# Patient Record
Sex: MALE | Race: OTHER | NOT HISPANIC OR LATINO
[De-identification: names, ages, dates, MRNs, and addresses within clinical notes are randomized per-mention and may not be internally consistent; named-entity substitution may affect disease eponyms.]

---
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
## 2020-08-12 ENCOUNTER — TRANSCRIPTION ENCOUNTER (OUTPATIENT)
Age: 20
End: 2020-08-12

## 2022-12-13 ENCOUNTER — APPOINTMENT (OUTPATIENT)
Dept: ORTHOPEDIC SURGERY | Facility: CLINIC | Age: 22
End: 2022-12-13

## 2022-12-13 VITALS — HEIGHT: 68 IN | WEIGHT: 150 LBS | BODY MASS INDEX: 22.73 KG/M2

## 2022-12-13 DIAGNOSIS — S93.491A SPRAIN OF OTHER LIGAMENT OF RIGHT ANKLE, INITIAL ENCOUNTER: ICD-10-CM

## 2022-12-13 PROCEDURE — 99203 OFFICE O/P NEW LOW 30 MIN: CPT

## 2022-12-13 PROCEDURE — 73610 X-RAY EXAM OF ANKLE: CPT | Mod: RT

## 2022-12-13 NOTE — IMAGING
[de-identified] : On examination of his right ankle he has mild swelling over the lateral aspect of his ankle.  No erythema, no ecchymosis.  He is nontender over the medial malleolus or the deltoid ligament.  He is mildly tender over the lateral malleolus, ATFL and CFL.  He is nontender over the talar dome.  He is nontender over the Achilles, negative Cuello's test.  No calf tenderness.  He has no tenderness palpation over the metatarsals or the Lisfranc joint.  He has good range of motion of the ankle, sensation is intact throughout, 2+ DP and PT pulses.\par \par   X-rays taken the office today of the right ankle show no acute fractures, dislocations, or other bony abnormalities.

## 2022-12-13 NOTE — DISCUSSION/SUMMARY
[de-identified] :   At this time I discussed him it is normal at 3 weeks is still have some pain and swelling in the ankle after an ankle sprain.  The patient understands that these injuries take 4-6 weeks to heal.  The more they are on their feet in a day, the more swollen they will be at the end of the day. He can wear an ankle compression sleeve for support, he can do activity as tolerated.  Ice or warm compresses as needed for pain.  Anti-inflammatories as needed.  I will see him back in a few weeks repeat evaluation if he continues to have pain. Patient will call me if any other problems or concerns.  Patient verbalized understanding and agreed with the plan, all questions were answered in the office today.\par

## 2022-12-13 NOTE — HISTORY OF PRESENT ILLNESS
[de-identified] : A 22-year-old male is here today for evaluation of his right ankle.  He states he rolled his ankle playing basketball about 3 weeks ago.  He states since then he has been having pain and swelling to the lateral aspect of the ankle.  He states he has had sprains to this ankle in the past that usually are healed by now so he was concerned that he was still having pain.  He did not have any x-rays or evaluation prior to coming into the office today.  He denies any pain in his foot, he denies any numbness or tingling.

## 2023-01-20 ENCOUNTER — APPOINTMENT (OUTPATIENT)
Dept: ORTHOPEDIC SURGERY | Facility: CLINIC | Age: 23
End: 2023-01-20

## 2024-12-12 ENCOUNTER — APPOINTMENT (OUTPATIENT)
Dept: ORTHOPEDIC SURGERY | Facility: CLINIC | Age: 24
End: 2024-12-12
Payer: COMMERCIAL

## 2024-12-12 DIAGNOSIS — S69.92XA UNSPECIFIED INJURY OF LEFT WRIST, HAND AND FINGER(S), INITIAL ENCOUNTER: ICD-10-CM

## 2024-12-12 PROCEDURE — 99213 OFFICE O/P EST LOW 20 MIN: CPT

## 2025-01-09 ENCOUNTER — APPOINTMENT (OUTPATIENT)
Dept: ORTHOPEDIC SURGERY | Facility: CLINIC | Age: 25
End: 2025-01-09